# Patient Record
Sex: MALE | Race: WHITE | NOT HISPANIC OR LATINO | URBAN - METROPOLITAN AREA
[De-identification: names, ages, dates, MRNs, and addresses within clinical notes are randomized per-mention and may not be internally consistent; named-entity substitution may affect disease eponyms.]

---

## 2021-01-08 PROCEDURE — U0004 COV-19 TEST NON-CDC HGH THRU: HCPCS | Performed by: NURSE PRACTITIONER

## 2021-03-23 ENCOUNTER — BULK ORDERING (OUTPATIENT)
Dept: CASE MANAGEMENT | Facility: OTHER | Age: 26
End: 2021-03-23

## 2021-03-23 DIAGNOSIS — Z23 IMMUNIZATION DUE: ICD-10-CM

## 2021-12-09 PROCEDURE — U0004 COV-19 TEST NON-CDC HGH THRU: HCPCS | Performed by: PHYSICIAN ASSISTANT

## 2021-12-14 PROCEDURE — U0004 COV-19 TEST NON-CDC HGH THRU: HCPCS | Performed by: PHYSICIAN ASSISTANT

## 2024-06-14 ENCOUNTER — OFFICE VISIT (OUTPATIENT)
Dept: ORTHOPEDIC SURGERY | Facility: CLINIC | Age: 29
End: 2024-06-14
Payer: MEDICAID

## 2024-06-14 VITALS — TEMPERATURE: 98.2 F | HEIGHT: 70 IN | BODY MASS INDEX: 26.51 KG/M2 | WEIGHT: 185.2 LBS

## 2024-06-14 DIAGNOSIS — S63.502A SPRAIN OF LEFT WRIST, INITIAL ENCOUNTER: Primary | ICD-10-CM

## 2024-06-14 NOTE — PROGRESS NOTES
"    Office Note     Name: Kiko Tran    : 1995     MRN: [id number]     Chief Complaint  Pain and Injury of the Left Wrist (Reports wrist pain after slamming 60 lb weight down on 24, seen at [facility] next day, neg xray, placed in brace, states he wore brace up until yesterday, pain has decreased, no swelling)    Subjective     History of Present Illness:  Kiko Tran is a 28 y.o. male presenting today for evaluation of acute left dorsal wrist pain ongoing for approximately 8 days.  Patient states that while at the gym he was reracking a 60 pound weight bar when he put the bar down to aggressively resulting in jamming his left wrist.  He states the pain was pretty severe initially but over the past 8 days has significantly improved.  He does continue to have pain in the dorsal left wrist especially with supination and pronation of the wrist.  He denies any significant pain at this time with flexion, extension or with making a composite fist.  He denies any paresthesias.  He denies any other acute injuries from the event.  He denies any previous left wrist injuries or surgeries.  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    Review of Systems     History reviewed. No pertinent past medical history.     History reviewed. No pertinent surgical history.    History reviewed. No pertinent family history.    Social History     Socioeconomic History    Marital status: Single   Tobacco Use    Smoking status: Never    Smokeless tobacco: Never   Vaping Use    Vaping status: Never Used   Substance and Sexual Activity    Alcohol use: Yes     Comment: occ    Drug use: Never    Sexual activity: Defer       No current outpatient medications on file.    No Known Allergies        Objective   Temp 98.2 °F (36.8 °C)   Ht 177.8 cm (70\")   Wt 84 kg (185 lb 3.2 oz)   BMI 26.57 kg/m²    BMI is >= 25 and <30. (Overweight) The following options were offered after discussion;: weight loss " educational material (shared in after visit summary)       Physical Exam  Left Hand Exam     Comments:  There is no significant swelling bruising erythema.  No open wounds or gross deformity.  There is no significant tenderness in the dorsal wrist.  Patient has grossly full range of motion with flexion, extension, radial and ulnar deviation.  He has full pronation though pain at end range of motion.  Supination is slightly limited to approximately 70 degrees and painful.  There is no crepitus in the wrist.  Carpal pulses 2+.  Sensation intact to light touch.  Patient is able to make a composite fist and has a normal cascade of motion.           Extremity DVT signs are negative by clinical screen.     Independent Review of Radiographic Studies:    Reviewed images and agree with radiologist interpretation.      Study Result    Narrative & Impression   FINAL REPORT     CLINICAL HISTORY:  left wrist     FINDINGS:  LEFT WRIST  Three views demonstrate no acute fracture or  dislocation. The visualized joint spaces are normally aligned.  The soft tissues are unremarkable.     IMPRESSION:  No acute bony abnormality.     Authenticated and Electronically Signed by Glen Rubio III, MD on 06/08/2024 07:48:00 AM       Procedures    Assessment and Plan   Diagnoses and all orders for this visit:    1. Sprain of left wrist, initial encounter (Primary)       Discussion of orthopedic goals  Orthopedic activities reviewed and patient expressed appreciation  Regular exercise as tolerated  Risk, benefits, and merits of treatment alternatives reviewed with the patient and questions answered  Patient guided on mobility and conditioning exercises  Ice, heat, and/or modalities as beneficial  Reduced physical activity as appropriate and avoid offending activity  Weight bearing parameters reviewed    Recommendations/Plan:  Exercise, medications, injections, other patient advice, and return appointment as noted.  Patient is encouraged to  call or return for any issues or concerns.    I advised conservative treatment including RICE and over-the-counter analgesics only as needed.  I did advise gentle exercise and stretching with the left wrist to promote rehabilitation and I advised significantly lowering the weight during any exercise as to not flareup the symptoms.  Advised follow-up with me if symptoms fail to improve in the next 3 to 4 weeks.    Return if symptoms worsen or fail to improve.  Patient agreeable to call or return sooner for any concerns.

## 2024-07-09 ENCOUNTER — OFFICE VISIT (OUTPATIENT)
Dept: ORTHOPEDIC SURGERY | Facility: CLINIC | Age: 29
End: 2024-07-09
Payer: MEDICAID

## 2024-07-09 VITALS
WEIGHT: 180 LBS | DIASTOLIC BLOOD PRESSURE: 70 MMHG | BODY MASS INDEX: 25.77 KG/M2 | HEIGHT: 70 IN | TEMPERATURE: 98.4 F | SYSTOLIC BLOOD PRESSURE: 110 MMHG

## 2024-07-09 DIAGNOSIS — S63.502D SPRAIN OF LEFT WRIST, SUBSEQUENT ENCOUNTER: Primary | ICD-10-CM

## 2024-07-09 PROCEDURE — 1159F MED LIST DOCD IN RCRD: CPT | Performed by: STUDENT IN AN ORGANIZED HEALTH CARE EDUCATION/TRAINING PROGRAM

## 2024-07-09 PROCEDURE — 99213 OFFICE O/P EST LOW 20 MIN: CPT | Performed by: STUDENT IN AN ORGANIZED HEALTH CARE EDUCATION/TRAINING PROGRAM

## 2024-07-09 PROCEDURE — 1160F RVW MEDS BY RX/DR IN RCRD: CPT | Performed by: STUDENT IN AN ORGANIZED HEALTH CARE EDUCATION/TRAINING PROGRAM

## 2024-07-09 NOTE — PROGRESS NOTES
Office Note     Name: Kiko Tran    : 1995     MRN: [id number]     Chief Complaint  Follow-up and Pain of the Left Wrist (Still having pain in wrist, has not improved. DOI: 24)    Subjective     History of Present Illness:  Kiko Tran is a 28 y.o. male presenting today for 3-1/2-week follow-up for left wrist injury.  Patient states that he continues to have pain in the left wrist especially with gripping, lifting, and supination and pronation of the wrist.  He denies any new or worsening symptoms only continued pain.  He states that his symptoms have not improved over the past 3 weeks.  The pain is mostly present in the area of the DRUJ although some pain at the ulnar fovea.  Denies any pain in the proximal forearm or hand.  Reports good compliance with home exercise plan and over-the-counter analgesics.  States that he is still very limited with the use of his left wrist and pain limits how much exercise he can do with the left wrist.      Review of Systems   Constitutional:  Negative for fever.   HENT:  Negative for dental problem and voice change.    Eyes:  Negative for visual disturbance.   Respiratory:  Negative for shortness of breath.    Cardiovascular:  Negative for chest pain.   Gastrointestinal:  Negative for abdominal pain.   Genitourinary:  Negative for dysuria.   Musculoskeletal:  Positive for arthralgias. Negative for gait problem and joint swelling.   Skin:  Negative for rash.   Neurological:  Negative for speech difficulty.   Hematological:  Does not bruise/bleed easily.   Psychiatric/Behavioral:  Negative for confusion.         No past medical history on file.     No past surgical history on file.    No family history on file.    Social History     Socioeconomic History    Marital status: Single   Tobacco Use    Smoking status: Never    Smokeless tobacco: Never   Vaping Use    Vaping status: Never Used   Substance and Sexual Activity    Alcohol use: Yes     Comment: occ    Drug  "use: Never    Sexual activity: Defer       No current outpatient medications on file.    No Known Allergies        Objective   /70   Temp 98.4 °F (36.9 °C)   Ht 177.8 cm (70\")   Wt 81.6 kg (180 lb)   BMI 25.83 kg/m²            Physical Exam  Left Hand Exam     Comments:  There is no significant erythema or bruising.  There is mild soft tissue swelling compared to the contralateral side.  There is mild tenderness with palpation of the radial ulnar joint.  Mildly positive fovea sign.  Mildly positive piano key test, negative ulnar grind test.  Some crepitus noted with piano key test of the left wrist.  Full range of motion in all fingers and wrist.  Increased pain with resisted flexion and extension of the wrist.  Normal inspection of the proximal forearm and elbow.           Extremity DVT signs are negative by clinical screen.     Independent Review of Radiographic Studies:    Three-view plain films of the left wrist were done in office today for the evaluation of pain, comparison views available.  No acute osseous abnormalities are noted.  No significant degenerative changes.  No interval changes from previous exam.    Procedures    Assessment and Plan   Diagnoses and all orders for this visit:    1. Sprain of left wrist, subsequent encounter (Primary)  -     XR Wrist 3+ View Left  -     Ambulatory Referral to Physical Therapy  -     MRI Wrist Left Without Contrast       Discussion of orthopedic goals  Orthopedic activities reviewed and patient expressed appreciation  Regular exercise as tolerated  Risk, benefits, and merits of treatment alternatives reviewed with the patient and questions answered  Patient guided on mobility and conditioning exercises  Ice, heat, and/or modalities as beneficial  Elevate hand for residual swelling  Physical therapy referral given  Physical therapy ongoing  Reduced physical activity as appropriate and avoid offending activity  Weight bearing parameters reviewed  Counseling " on diet, nutrition, fitness exercise, and weight reduction goals    Recommendations/Plan:  Exercise, medications, injections, other patient advice, and return appointment as noted.  Referral: Physical and Occupational Therapy referral.  Test/Studies: MRI without contrast left wrist  Patient is encouraged to call or return for any issues or concerns.    Differential diagnosis:  DRUJ sprain, TFCC injury, occult fracture, scapholunate ligament injury    An MRI without contrast of the left wrist was ordered to evaluate the soft tissue.  There is high suspicion for a DRUJ sprain or similar injury as well as a TFCC injury.  Patient has been doing home exercise plan and has had poor improvement.  I advised him to continue doing home exercise plan and he was also given a course of formal physical therapy of the left wrist.  Otherwise continue current treatment plan.  Advised follow-up with me after his MRI or after completing physical therapy whichever comes first.  Advised patient to call or return office for any concerns in the interim.    Return for Follow-up after MRI or after physical therapy.  Patient agreeable to call or return sooner for any concerns.

## 2024-07-09 NOTE — LETTER
July 11, 2024     Patient: Kiko Tran   YOB: 1995   Date of Visit: 7/9/2024       To Whom It May Concern:    It is my medical opinion that Kiko Tran may return to full duty immediately with no restrictions.           Sincerely,        Davin Cornelius PA-C

## 2024-07-28 ENCOUNTER — HOSPITAL ENCOUNTER (OUTPATIENT)
Facility: HOSPITAL | Age: 29
Discharge: HOME | End: 2024-07-28
Admitting: STUDENT IN AN ORGANIZED HEALTH CARE EDUCATION/TRAINING PROGRAM
Payer: MEDICAID

## 2024-07-28 PROCEDURE — 73221 MRI JOINT UPR EXTREM W/O DYE: CPT
